# Patient Record
Sex: MALE | Race: WHITE | Employment: UNEMPLOYED | URBAN - METROPOLITAN AREA
[De-identification: names, ages, dates, MRNs, and addresses within clinical notes are randomized per-mention and may not be internally consistent; named-entity substitution may affect disease eponyms.]

---

## 2021-07-08 ENCOUNTER — OFFICE VISIT (OUTPATIENT)
Dept: INTERNAL MEDICINE CLINIC | Age: 21
End: 2021-07-08
Payer: MEDICAID

## 2021-07-08 VITALS
RESPIRATION RATE: 16 BRPM | TEMPERATURE: 98.1 F | SYSTOLIC BLOOD PRESSURE: 130 MMHG | HEART RATE: 75 BPM | BODY MASS INDEX: 39.17 KG/M2 | WEIGHT: 315 LBS | HEIGHT: 75 IN | DIASTOLIC BLOOD PRESSURE: 84 MMHG | OXYGEN SATURATION: 98 %

## 2021-07-08 DIAGNOSIS — Z00.00 ENCOUNTER FOR MEDICAL EXAMINATION TO ESTABLISH CARE: Primary | ICD-10-CM

## 2021-07-08 DIAGNOSIS — J45.20 MILD INTERMITTENT ASTHMA WITHOUT COMPLICATION: ICD-10-CM

## 2021-07-08 DIAGNOSIS — E55.9 VITAMIN D DEFICIENCY: ICD-10-CM

## 2021-07-08 DIAGNOSIS — F32.A DEPRESSION, UNSPECIFIED DEPRESSION TYPE: ICD-10-CM

## 2021-07-08 DIAGNOSIS — F41.9 ANXIETY: ICD-10-CM

## 2021-07-08 DIAGNOSIS — F90.9 ATTENTION DEFICIT HYPERACTIVITY DISORDER (ADHD), UNSPECIFIED ADHD TYPE: ICD-10-CM

## 2021-07-08 DIAGNOSIS — Z11.59 NEED FOR HEPATITIS C SCREENING TEST: ICD-10-CM

## 2021-07-08 DIAGNOSIS — F43.10 PTSD (POST-TRAUMATIC STRESS DISORDER): ICD-10-CM

## 2021-07-08 PROBLEM — J45.909 MILD ASTHMA: Status: ACTIVE | Noted: 2021-07-08

## 2021-07-08 PROCEDURE — 99203 OFFICE O/P NEW LOW 30 MIN: CPT | Performed by: INTERNAL MEDICINE

## 2021-07-08 RX ORDER — ALBUTEROL SULFATE 90 UG/1
AEROSOL, METERED RESPIRATORY (INHALATION)
COMMUNITY

## 2021-07-08 RX ORDER — LORATADINE 10 MG/1
10 TABLET ORAL
COMMUNITY

## 2021-07-08 RX ORDER — DEXTROAMPHETAMINE SACCHARATE, AMPHETAMINE ASPARTATE, DEXTROAMPHETAMINE SULFATE AND AMPHETAMINE SULFATE 7.5; 7.5; 7.5; 7.5 MG/1; MG/1; MG/1; MG/1
30 TABLET ORAL
COMMUNITY

## 2021-07-08 RX ORDER — OXCARBAZEPINE 150 MG/1
TABLET, FILM COATED ORAL
COMMUNITY

## 2021-07-08 RX ORDER — ALPRAZOLAM 1 MG/1
TABLET ORAL
COMMUNITY

## 2021-07-08 RX ORDER — DEXTROAMPHETAMINE SACCHARATE, AMPHETAMINE ASPARTATE MONOHYDRATE, DEXTROAMPHETAMINE SULFATE AND AMPHETAMINE SULFATE 7.5; 7.5; 7.5; 7.5 MG/1; MG/1; MG/1; MG/1
30 CAPSULE, EXTENDED RELEASE ORAL
COMMUNITY

## 2021-07-08 RX ORDER — LISINOPRIL 10 MG/1
TABLET ORAL DAILY
COMMUNITY
End: 2022-01-27 | Stop reason: SDUPTHER

## 2021-07-08 RX ORDER — DEXTROAMPHETAMINE SACCHARATE, AMPHETAMINE ASPARTATE MONOHYDRATE, DEXTROAMPHETAMINE SULFATE AND AMPHETAMINE SULFATE 5; 5; 5; 5 MG/1; MG/1; MG/1; MG/1
20 CAPSULE, EXTENDED RELEASE ORAL
COMMUNITY

## 2021-07-08 NOTE — PROGRESS NOTES
Chief Complaint   Patient presents with   [address] Other     bump on inner left thigh     3 most recent PHQ Screens 7/8/2021   Little interest or pleasure in doing things Several days   Feeling down, depressed, irritable, or hopeless Several days   Total Score PHQ 2 2     Visit Vitals  /84 (BP 1 Location: Left arm, BP Patient Position: Sitting, BP Cuff Size: Large adult)   Pulse 75   Temp 98.1 °F (36.7 °C) (Oral)   Resp 16   Ht 6' 3\" (1.905 m)   Wt (!) 365 lb 9.6 oz (165.8 kg)   SpO2 98%   BMI 45.70 kg/m²     1. Have you been to the ER, urgent care clinic since your last visit? Hospitalized since your last visit?no    2. Have you seen or consulted any other health care providers outside of the [address] [place] [address] since your last visit? Include any pap smears or colon screening.  Therapist

## 2021-07-08 NOTE — PROGRESS NOTES
HISTORY OF PRESENT ILLNESS  Elver Huitron is a 23 y.o. male. HPI Patient here to establish care. Reports history of PTSD, Depression, Anxiety and ADHD. He sees Psych for his mental health every three months. There as been no changes to his medication and states he feels well. Psych refills his medications. Reports history of seasonal allergies and use of prn Albuterol mostly in winter months. Reports a painful pimple on left inner thigh, that appears to be irritated upon exam, and there is no drainage. Reports having HPV immunization    Visit Vitals  /84 (BP 1 Location: Left arm, BP Patient Position: Sitting, BP Cuff Size: Large adult)   Pulse 75   Temp 98.1 °F (36.7 °C) (Oral)   Resp 16   Ht 6' 3\" (1.905 m)   Wt (!) 365 lb 9.6 oz (165.8 kg)   SpO2 98%   BMI 45.70 kg/m²     Past Medical History:   Diagnosis Date    ADHD (attention deficit hyperactivity disorder)     Anxiety     Asthma     Hypercholesteremia     Hypertension     Mood swings      Past Surgical History:   Procedure Laterality Date    HX WISDOM TEETH EXTRACTION       Cancer-related family history is not on file. Family History   Problem Relation Age of Onset    Diabetes Mother     Hypertension Mother     Asthma Mother    Haroldo Galla Schizophrenia Father      Outpatient Encounter Medications as of 7/8/2021   Medication Sig Dispense Refill    lisinopriL (PRINIVIL, ZESTRIL) 10 mg tablet Take  by mouth daily.  ALPRAZolam (XANAX) 1 mg tablet Take  by mouth.  dextroamphetamine-amphetamine (AdderalL) 30 mg tablet Take 30 mg by mouth.  amphetamine-dextroamphetamine XR (Adderall XR) 30 mg XR capsule Take 30 mg by mouth every morning.  amphetamine-dextroamphetamine XR (Adderall XR) 20 mg XR capsule Take 20 mg by mouth every morning.  loratadine (CLARITIN) 10 mg tablet Take 10 mg by mouth.  OXcarbazepine (TRILEPTAL) 150 mg tablet Take  by mouth.       albuterol (PROVENTIL HFA, VENTOLIN HFA, PROAIR HFA) 90 mcg/actuation inhaler Take  by inhalation.  ARIPiprazole (ABILIFY) 5 mg tablet Take  by mouth daily.  sertraline (ZOLOFT) 100 mg tablet Take  by mouth daily. No facility-administered encounter medications on file as of 7/8/2021. Review of Systems   Constitutional: Negative. HENT: Negative. Eyes: Negative. Respiratory: Negative. Cardiovascular: Negative. Gastrointestinal: Negative. Genitourinary: Negative. Musculoskeletal: Negative. Skin:        Reports painful pimple on the left inner thigh   Neurological: Negative. Endo/Heme/Allergies: Negative. Psychiatric/Behavioral: Negative. Physical Exam  Constitutional:       Appearance: Normal appearance. HENT:      Head: Normocephalic and atraumatic. Nose: Nose normal.      Mouth/Throat:      Mouth: Mucous membranes are dry. Eyes:      Extraocular Movements: Extraocular movements intact. Cardiovascular:      Rate and Rhythm: Normal rate and regular rhythm. Pulmonary:      Effort: Pulmonary effort is normal.      Breath sounds: Normal breath sounds. Abdominal:      Palpations: Abdomen is soft. Musculoskeletal:         General: Normal range of motion. Cervical back: Normal range of motion and neck supple. Skin:     Findings: Erythema present. Comments: Left inner thigh with a pimple that is slightly erythematous. No signs of infection. No heat    Neurological:      Mental Status: He is alert and oriented to person, place, and time. Psychiatric:         Mood and Affect: Mood normal.         ASSESSMENT and PLAN  Diagnoses and all orders for this visit:    1. Encounter for medical examination to establish care    2. Mild intermittent asthma without complication    3. PTSD (post-traumatic stress disorder)    4. Depression, unspecified depression type    5. Anxiety  -     CBC WITH AUTOMATED DIFF; Future  -     METABOLIC PANEL, COMPREHENSIVE; Future  -     TSH 3RD GENERATION; Future    6.  Attention deficit hyperactivity disorder (ADHD), unspecified ADHD type    7. Need for hepatitis C screening test  -     HEPATITIS C AB; Future    8. Vitamin D deficiency  -     VITAMIN D, 25 HYDROXY; Future      Follow-up and Dispositions    · Return in about 6 months (around 1/8/2022), or if symptoms worsen or fail to improve, for Follow up.       lab results and schedule of future lab studies reviewed with patient  reviewed diet, exercise and weight control  reviewed medications and side effects in detail   Advised patient to continue to monitor pimple on the left inner thigh. Report back if it increases in size, redness, swelling or has drainage. Also call back if he develops fever.

## 2021-07-09 LAB
25(OH)D3+25(OH)D2 SERPL-MCNC: 11.4 NG/ML (ref 30–100)
ALBUMIN SERPL-MCNC: 4.4 G/DL (ref 4.1–5.2)
ALBUMIN/GLOB SERPL: 1.6 {RATIO} (ref 1.2–2.2)
ALP SERPL-CCNC: 84 IU/L (ref 48–121)
ALT SERPL-CCNC: 48 IU/L (ref 0–44)
AST SERPL-CCNC: 25 IU/L (ref 0–40)
BASOPHILS # BLD AUTO: 0.1 X10E3/UL (ref 0–0.2)
BASOPHILS NFR BLD AUTO: 1 %
BILIRUB SERPL-MCNC: 0.2 MG/DL (ref 0–1.2)
BUN SERPL-MCNC: 8 MG/DL (ref 6–20)
BUN/CREAT SERPL: 10 (ref 9–20)
CALCIUM SERPL-MCNC: 9.9 MG/DL (ref 8.7–10.2)
CHLORIDE SERPL-SCNC: 101 MMOL/L (ref 96–106)
CO2 SERPL-SCNC: 26 MMOL/L (ref 20–29)
CREAT SERPL-MCNC: 0.8 MG/DL (ref 0.76–1.27)
EOSINOPHIL # BLD AUTO: 0.2 X10E3/UL (ref 0–0.4)
EOSINOPHIL NFR BLD AUTO: 2 %
ERYTHROCYTE [DISTWIDTH] IN BLOOD BY AUTOMATED COUNT: 13.1 % (ref 11.6–15.4)
GLOBULIN SER CALC-MCNC: 2.7 G/DL (ref 1.5–4.5)
GLUCOSE SERPL-MCNC: 83 MG/DL (ref 65–99)
HCT VFR BLD AUTO: 46.4 % (ref 37.5–51)
HCV AB S/CO SERPL IA: <0.1 S/CO RATIO (ref 0–0.9)
HGB BLD-MCNC: 15.8 G/DL (ref 13–17.7)
IMM GRANULOCYTES # BLD AUTO: 0 X10E3/UL (ref 0–0.1)
IMM GRANULOCYTES NFR BLD AUTO: 0 %
LYMPHOCYTES # BLD AUTO: 1.9 X10E3/UL (ref 0.7–3.1)
LYMPHOCYTES NFR BLD AUTO: 17 %
MCH RBC QN AUTO: 30 PG (ref 26.6–33)
MCHC RBC AUTO-ENTMCNC: 34.1 G/DL (ref 31.5–35.7)
MCV RBC AUTO: 88 FL (ref 79–97)
MONOCYTES # BLD AUTO: 0.6 X10E3/UL (ref 0.1–0.9)
MONOCYTES NFR BLD AUTO: 6 %
NEUTROPHILS # BLD AUTO: 8.1 X10E3/UL (ref 1.4–7)
NEUTROPHILS NFR BLD AUTO: 74 %
PLATELET # BLD AUTO: 220 X10E3/UL (ref 150–450)
POTASSIUM SERPL-SCNC: 4.5 MMOL/L (ref 3.5–5.2)
PROT SERPL-MCNC: 7.1 G/DL (ref 6–8.5)
RBC # BLD AUTO: 5.27 X10E6/UL (ref 4.14–5.8)
SODIUM SERPL-SCNC: 145 MMOL/L (ref 134–144)
TSH SERPL DL<=0.005 MIU/L-ACNC: 3.03 UIU/ML (ref 0.45–4.5)
WBC # BLD AUTO: 10.8 X10E3/UL (ref 3.4–10.8)

## 2021-07-09 RX ORDER — ERGOCALCIFEROL 1.25 MG/1
50000 CAPSULE ORAL
Qty: 12 CAPSULE | Refills: 0 | Status: SHIPPED | OUTPATIENT
Start: 2021-07-09

## 2021-07-09 NOTE — PROGRESS NOTES
Vitamin d low. Will replace it weekly and the go to a daily supplement of 2000u.   All other labs stable

## 2022-01-27 ENCOUNTER — OFFICE VISIT (OUTPATIENT)
Dept: INTERNAL MEDICINE CLINIC | Age: 22
End: 2022-01-27
Payer: MEDICAID

## 2022-01-27 VITALS
RESPIRATION RATE: 20 BRPM | HEIGHT: 75 IN | WEIGHT: 315 LBS | BODY MASS INDEX: 39.17 KG/M2 | HEART RATE: 70 BPM | OXYGEN SATURATION: 98 % | DIASTOLIC BLOOD PRESSURE: 84 MMHG | SYSTOLIC BLOOD PRESSURE: 132 MMHG | TEMPERATURE: 98.5 F

## 2022-01-27 DIAGNOSIS — M54.9 MID BACK PAIN: ICD-10-CM

## 2022-01-27 DIAGNOSIS — R23.8 DRY SCALP: ICD-10-CM

## 2022-01-27 DIAGNOSIS — F41.9 ANXIETY: ICD-10-CM

## 2022-01-27 DIAGNOSIS — I10 HYPERTENSION, UNSPECIFIED TYPE: Primary | ICD-10-CM

## 2022-01-27 PROCEDURE — 99214 OFFICE O/P EST MOD 30 MIN: CPT | Performed by: INTERNAL MEDICINE

## 2022-01-27 RX ORDER — KETOCONAZOLE 20 MG/ML
1 SHAMPOO TOPICAL AS NEEDED
Qty: 1 EACH | Refills: 1 | Status: SHIPPED | OUTPATIENT
Start: 2022-01-27 | End: 2022-08-12

## 2022-01-27 RX ORDER — LISINOPRIL 10 MG/1
10 TABLET ORAL DAILY
Qty: 90 TABLET | Refills: 1 | Status: SHIPPED | OUTPATIENT
Start: 2022-01-27 | End: 2022-07-14

## 2022-01-27 NOTE — PROGRESS NOTES
Health Maintenance Due   Topic Date Due    DTaP/Tdap/Td series (1 - Tdap) Never done    Flu Vaccine (1) 09/01/2021    COVID-19 Vaccine (3 - Booster for Pfizer series) 09/15/2021       Chief Complaint   Patient presents with    ED Follow-up    Back Pain    Medication Refill       1. Have you been to the ER, urgent care clinic since your last visit? Hospitalized since your last visit? No    2. Have you seen or consulted any other health care providers outside of the [address] [place] [address] since your last visit? Include any pap smears or colon screening. No    3) Do you have an Advance Directive on file? no    4) Are you interested in receiving information on Advance Directives? NO      Patient is accompanied by self and patient I have received verbal consent from Jorge Mahmood to discuss any/all medical information while they are present in the room.

## 2022-01-27 NOTE — PROGRESS NOTES
HISTORY OF PRESENT ILLNESS  Laly Newby is a 24 y.o. male. Patient was seen for an ED visit and follow up. Reports that he was working with his sick dog. Had been lifting and pushing him. Reports that he began to have some sharp pain to the back. This occurred last week. Is not better, but not worse. Has been taking OTC and muscle relaxer's. Which this helps. No radiation, tingling or numbness. Continues to see psych every 3 months. No changes to current medications. HTN: stable. Does not watch his BP at home. Does eat a lot of easy meals that have salt. Is COVID and flu vaccinated. Visit Vitals  /84 (BP 1 Location: Right arm, BP Patient Position: Sitting, BP Cuff Size: Adult)   Pulse 70   Temp 98.5 °F (36.9 °C) (Oral)   Resp 20   Ht 6' 3\" (1.905 m)   Wt (!) 381 lb (172.8 kg)   SpO2 98%   BMI 47.62 kg/m²     Past Medical History:   Diagnosis Date    ADHD (attention deficit hyperactivity disorder)     Anxiety     Asthma     Hypercholesteremia     Hypertension     Mood swings      Past Surgical History:   Procedure Laterality Date    HX WISDOM TEETH EXTRACTION       Family History   Problem Relation Age of Onset    Diabetes Mother     Hypertension Mother     Asthma Mother     Schizophrenia Father      Outpatient Encounter Medications as of 1/27/2022   Medication Sig Dispense Refill    lisinopriL (PRINIVIL, ZESTRIL) 10 mg tablet Take 1 Tablet by mouth daily. 90 Tablet 1    ketoconazole (NIZORAL) 2 % shampoo Apply 120 mL to affected area as needed for Itching. 1 Each 1    ergocalciferol (ERGOCALCIFEROL) 1,250 mcg (50,000 unit) capsule Take 1 Capsule by mouth every seven (7) days. 12 Capsule 0    ALPRAZolam (XANAX) 1 mg tablet Take  by mouth.  dextroamphetamine-amphetamine (AdderalL) 30 mg tablet Take 30 mg by mouth.  amphetamine-dextroamphetamine XR (Adderall XR) 30 mg XR capsule Take 30 mg by mouth every morning.       amphetamine-dextroamphetamine XR (Adderall XR) 20 mg XR capsule Take 20 mg by mouth every morning.  loratadine (CLARITIN) 10 mg tablet Take 10 mg by mouth.  OXcarbazepine (TRILEPTAL) 150 mg tablet Take  by mouth.  albuterol (PROVENTIL HFA, VENTOLIN HFA, PROAIR HFA) 90 mcg/actuation inhaler Take  by inhalation.  ARIPiprazole (ABILIFY) 5 mg tablet Take  by mouth daily.  sertraline (ZOLOFT) 100 mg tablet Take  by mouth daily.  [DISCONTINUED] lisinopriL (PRINIVIL, ZESTRIL) 10 mg tablet Take  by mouth daily. No facility-administered encounter medications on file as of 1/27/2022. HPI    Review of Systems   Constitutional: Negative. Respiratory: Negative. Cardiovascular: Negative. Gastrointestinal: Negative. Musculoskeletal: Positive for back pain. Skin: Positive for rash. Neurological: Negative. Psychiatric/Behavioral: Negative. Physical Exam  Vitals and nursing note reviewed. Cardiovascular:      Rate and Rhythm: Normal rate and regular rhythm. Pulmonary:      Effort: Pulmonary effort is normal.      Breath sounds: Normal breath sounds. Abdominal:      Palpations: Abdomen is soft. Musculoskeletal:      Thoracic back: Spasms present. No tenderness. Lumbar back: Normal.        Back:    Skin:     General: Skin is warm. Neurological:      Mental Status: He is alert and oriented to person, place, and time. Psychiatric:         Behavior: Behavior normal.         ASSESSMENT and PLAN  Diagnoses and all orders for this visit:    1. Hypertension, unspecified type  -     lisinopriL (PRINIVIL, ZESTRIL) 10 mg tablet; Take 1 Tablet by mouth daily. -     DASH diet reviewed. 2. Dry scalp  -     ketoconazole (NIZORAL) 2 % shampoo; Apply 120 mL to affected area as needed for Itching. 3. Mid back pain        -     OTC every 8 hours         -     Reviewed ROM  4. Anxiety      Follow-up and Dispositions    · Return in about 6 months (around 7/27/2022), or if symptoms worsen or fail to improve. lab results and schedule of future lab studies reviewed with patient  reviewed diet, exercise and weight control  reviewed medications and side effects in detail

## 2022-03-18 PROBLEM — F43.10 PTSD (POST-TRAUMATIC STRESS DISORDER): Status: ACTIVE | Noted: 2021-07-08

## 2022-03-19 PROBLEM — F41.9 ANXIETY: Status: ACTIVE | Noted: 2021-07-08

## 2022-03-19 PROBLEM — J45.909 MILD ASTHMA: Status: ACTIVE | Noted: 2021-07-08

## 2022-03-19 PROBLEM — F90.9 ADHD: Status: ACTIVE | Noted: 2021-07-08

## 2022-03-20 PROBLEM — F32.A DEPRESSION: Status: ACTIVE | Noted: 2021-07-08

## 2022-07-13 DIAGNOSIS — I10 HYPERTENSION, UNSPECIFIED TYPE: ICD-10-CM

## 2022-07-14 RX ORDER — LISINOPRIL 10 MG/1
TABLET ORAL
Qty: 30 TABLET | Refills: 5 | Status: SHIPPED | OUTPATIENT
Start: 2022-07-14 | End: 2022-09-13 | Stop reason: SDUPTHER

## 2022-08-12 DIAGNOSIS — R23.8 DRY SCALP: ICD-10-CM

## 2022-08-12 RX ORDER — KETOCONAZOLE 20 MG/ML
1 SHAMPOO TOPICAL AS NEEDED
Qty: 120 ML | Refills: 1 | Status: SHIPPED | OUTPATIENT
Start: 2022-08-12 | End: 2022-09-13 | Stop reason: SDUPTHER

## 2022-09-13 ENCOUNTER — OFFICE VISIT (OUTPATIENT)
Dept: INTERNAL MEDICINE CLINIC | Age: 22
End: 2022-09-13
Payer: MEDICAID

## 2022-09-13 VITALS
TEMPERATURE: 98.9 F | WEIGHT: 315 LBS | OXYGEN SATURATION: 97 % | DIASTOLIC BLOOD PRESSURE: 80 MMHG | RESPIRATION RATE: 18 BRPM | BODY MASS INDEX: 39.17 KG/M2 | HEART RATE: 87 BPM | HEIGHT: 75 IN | SYSTOLIC BLOOD PRESSURE: 132 MMHG

## 2022-09-13 DIAGNOSIS — Z23 ENCOUNTER FOR IMMUNIZATION: ICD-10-CM

## 2022-09-13 DIAGNOSIS — I10 HYPERTENSION, UNSPECIFIED TYPE: ICD-10-CM

## 2022-09-13 DIAGNOSIS — R23.8 DRY SCALP: Primary | ICD-10-CM

## 2022-09-13 PROCEDURE — 99213 OFFICE O/P EST LOW 20 MIN: CPT | Performed by: INTERNAL MEDICINE

## 2022-09-13 PROCEDURE — 90686 IIV4 VACC NO PRSV 0.5 ML IM: CPT | Performed by: INTERNAL MEDICINE

## 2022-09-13 RX ORDER — KETOCONAZOLE 20 MG/ML
1 SHAMPOO TOPICAL AS NEEDED
Qty: 120 ML | Refills: 1 | Status: SHIPPED | OUTPATIENT
Start: 2022-09-13

## 2022-09-13 RX ORDER — LISINOPRIL 10 MG/1
10 TABLET ORAL DAILY
Qty: 90 TABLET | Refills: 1 | Status: SHIPPED | OUTPATIENT
Start: 2022-09-13

## 2022-09-13 NOTE — PROGRESS NOTES
HISTORY OF PRESENT ILLNESS  General Urban is a 24 y.o. male. Patient was seen for a follow up. Reports that he has been taking his BP medications as directed. Watches his salt. Does not take his BP at home. Would like a refill on the shampoo for his hair. This did help. Also will take a flu vaccine. Still seeing psych every 3 months. Is doing well on all of his medications. Visit Vitals  /80 (BP 1 Location: Left upper arm, BP Patient Position: Sitting, BP Cuff Size: Adult)   Pulse 87   Temp 98.9 °F (37.2 °C) (Oral)   Resp 18   Ht 6' 3\" (1.905 m)   Wt (!) 387 lb 6 oz (175.7 kg)   SpO2 97%   BMI 48.42 kg/m²     Past Medical History:   Diagnosis Date    ADHD (attention deficit hyperactivity disorder)     Anxiety     Asthma     Hypercholesteremia     Hypertension     Mood swings      Past Surgical History:   Procedure Laterality Date    HX WISDOM TEETH EXTRACTION       Family History   Problem Relation Age of Onset    Diabetes Mother     Hypertension Mother     Asthma Mother     Schizophrenia Father      Outpatient Encounter Medications as of 9/13/2022   Medication Sig Dispense Refill    ketoconazole (NIZORAL) 2 % shampoo Apply 120 mL to affected area as needed for Itching. 120 mL 1    lisinopriL (PRINIVIL, ZESTRIL) 10 mg tablet Take 1 Tablet by mouth daily. 90 Tablet 1    ergocalciferol (ERGOCALCIFEROL) 1,250 mcg (50,000 unit) capsule Take 1 Capsule by mouth every seven (7) days. 12 Capsule 0    ALPRAZolam (XANAX) 1 mg tablet Take  by mouth. dextroamphetamine-amphetamine (ADDERALL) 30 mg tablet Take 30 mg by mouth. amphetamine-dextroamphetamine XR (ADDERALL XR) 30 mg XR capsule Take 30 mg by mouth every morning. amphetamine-dextroamphetamine XR (ADDERALL XR) 20 mg XR capsule Take 20 mg by mouth every morning. loratadine (CLARITIN) 10 mg tablet Take 10 mg by mouth. OXcarbazepine (TRILEPTAL) 150 mg tablet Take  by mouth.       albuterol (PROVENTIL HFA, VENTOLIN HFA, PROAIR HFA) 90 mcg/actuation inhaler Take  by inhalation. ARIPiprazole (ABILIFY) 5 mg tablet Take  by mouth daily. sertraline (ZOLOFT) 100 mg tablet Take  by mouth daily. [DISCONTINUED] ketoconazole (NIZORAL) 2 % shampoo APPLY 120 ML TO AFFECTED AREA AS NEEDED FOR ITCHING. 120 mL 1    [DISCONTINUED] lisinopriL (PRINIVIL, ZESTRIL) 10 mg tablet TAKE 1 TABLET BY MOUTH EVERY DAY 30 Tablet 5     No facility-administered encounter medications on file as of 9/13/2022. HPI    Review of Systems   Constitutional: Negative. Respiratory: Negative. Cardiovascular: Negative. Gastrointestinal: Negative. Skin:         Dry scalp    Neurological: Negative. Physical Exam  Vitals and nursing note reviewed. Constitutional:       Appearance: He is obese. Cardiovascular:      Rate and Rhythm: Normal rate and regular rhythm. Pulmonary:      Effort: Pulmonary effort is normal.      Breath sounds: Normal breath sounds. Abdominal:      Palpations: Abdomen is soft. Musculoskeletal:         General: Normal range of motion. Skin:     General: Skin is warm. Neurological:      Mental Status: He is alert and oriented to person, place, and time. Psychiatric:         Behavior: Behavior normal.       ASSESSMENT and PLAN  Diagnoses and all orders for this visit:    1. Dry scalp  -     ketoconazole (NIZORAL) 2 % shampoo; Apply 120 mL to affected area as needed for Itching. 2. Hypertension, unspecified type  -     lisinopriL (PRINIVIL, ZESTRIL) 10 mg tablet; Take 1 Tablet by mouth daily.     3. Encounter for immunization  -     INFLUENZA, FLUARIX, FLULAVAL, FLUZONE (AGE 6 MO+), AFLURIA(AGE 3Y+) IM, PF, 0.5 ML      Follow-up and Dispositions    Return in about 6 months (around 3/13/2023), or if symptoms worsen or fail to improve.       lab results and schedule of future lab studies reviewed with patient  reviewed medications and side effects in detail

## 2022-09-13 NOTE — PROGRESS NOTES
Chief Complaint   Patient presents with    Hypertension    Anxiety       Vitals:    09/13/22 1333   BP: 132/80   Pulse: 87   Resp: 18   Temp: 98.9 °F (37.2 °C)   TempSrc: Oral   SpO2: 97%   Weight: (!) 387 lb 6 oz (175.7 kg)   Height: 6' 3\" (1.905 m)   PainSc:   0 - No pain         Health Maintenance will be followed up by PCP. 1. Have you been to the ER, urgent care clinic since your last visit? Hospitalized since your last visit? No    2. Have you seen or consulted any other health care providers outside of the [address] [place] [address] since your last visit? Include any pap smears or colon screening.  No

## 2022-09-13 NOTE — PROGRESS NOTES
After obtaining consent, and per orders of GABE Shah, injection of Influenza in right deltoid of 0.5 ml given by Kali Clarke LPN. Patient instructed to remain in clinic for 15 minutes afterwards, and to report any adverse reaction to me immediately.

## 2023-03-06 ENCOUNTER — OFFICE VISIT (OUTPATIENT)
Dept: INTERNAL MEDICINE CLINIC | Age: 23
End: 2023-03-06
Payer: MEDICAID

## 2023-03-06 ENCOUNTER — HOSPITAL ENCOUNTER (OUTPATIENT)
Dept: GENERAL RADIOLOGY | Age: 23
Discharge: HOME | End: 2023-03-06
Payer: MEDICAID

## 2023-03-06 VITALS
BODY MASS INDEX: 39.17 KG/M2 | OXYGEN SATURATION: 97 % | DIASTOLIC BLOOD PRESSURE: 72 MMHG | TEMPERATURE: 98.9 F | HEIGHT: 75 IN | RESPIRATION RATE: 16 BRPM | SYSTOLIC BLOOD PRESSURE: 128 MMHG | HEART RATE: 81 BPM | WEIGHT: 315 LBS

## 2023-03-06 DIAGNOSIS — M79.641 PAIN OF RIGHT HAND: ICD-10-CM

## 2023-03-06 DIAGNOSIS — I10 HYPERTENSION, UNSPECIFIED TYPE: ICD-10-CM

## 2023-03-06 DIAGNOSIS — F41.9 ANXIETY: ICD-10-CM

## 2023-03-06 DIAGNOSIS — M79.644 FINGER PAIN, RIGHT: Primary | ICD-10-CM

## 2023-03-06 DIAGNOSIS — M79.644 FINGER PAIN, RIGHT: ICD-10-CM

## 2023-03-06 DIAGNOSIS — E55.9 VITAMIN D DEFICIENCY: ICD-10-CM

## 2023-03-06 PROCEDURE — 73130 X-RAY EXAM OF HAND: CPT

## 2023-03-06 PROCEDURE — 99213 OFFICE O/P EST LOW 20 MIN: CPT | Performed by: INTERNAL MEDICINE

## 2023-03-06 NOTE — PROGRESS NOTES
Health Maintenance Due   Topic Date Due    Pneumococcal 0-64 years (1 - PCV) Never done    DTaP/Tdap/Td series (1 - Tdap) Never done    COVID-19 Vaccine (3 - Booster for Pfizer series) 06/10/2021       Chief Complaint   Patient presents with    Asthma    Anxiety    Follow Up Chronic Condition       1. Have you been to the ER, urgent care clinic since your last visit? Hospitalized since your last visit? No    2. Have you seen or consulted any other health care providers outside of the [address] [place] [address] since your last visit? Include any pap smears or colon screening. No    3) Do you have an Advance Directive on file? no    4) Are you interested in receiving information on Advance Directives? NO      Patient is accompanied by self I have received verbal consent from Dg Gong to discuss any/all medical information while they are present in the room.

## 2023-03-06 NOTE — PROGRESS NOTES
HISTORY OF PRESENT ILLNESS  Lara Goldberg is a 20 y.o. male. Patient was seen for a follow up. Reports that in the last few weeks he has began to have right index finger pain and a tremor to the hand. Does type on a computer daily. To the PIP joint of the finger it is tender to touch. Also notes that the pain can radiate in to the top of the hand near the ringer finger. Denies swelling or injury. Has not taken anything to help. HTN: stable and tries to watch his sodium intake. Is still being seen by psych. This is going well. Will need blood work soon. Finger Pain    Anxiety    Follow Up Chronic Condition  Visit Vitals  /72 (BP 1 Location: Left upper arm, BP Patient Position: Sitting, BP Cuff Size: Adult)   Pulse 81   Temp 98.9 °F (37.2 °C) (Oral)   Resp 16   Ht 6' 3\" (1.905 m)   Wt (!) 387 lb (175.5 kg)   SpO2 97%   BMI 48.37 kg/m²     Past Medical History:   Diagnosis Date    ADHD (attention deficit hyperactivity disorder)     Anxiety     Asthma     Hypercholesteremia     Hypertension     Mood swings      Past Surgical History:   Procedure Laterality Date    HX WISDOM TEETH EXTRACTION       Family History   Problem Relation Age of Onset    Diabetes Mother     Hypertension Mother     Asthma Mother     Schizophrenia Father      Outpatient Encounter Medications as of 3/6/2023   Medication Sig Dispense Refill    ketoconazole (NIZORAL) 2 % shampoo Apply 120 mL to affected area as needed for Itching. 120 mL 1    lisinopriL (PRINIVIL, ZESTRIL) 10 mg tablet Take 1 Tablet by mouth daily. 90 Tablet 1    ALPRAZolam (XANAX) 1 mg tablet Take  by mouth. dextroamphetamine-amphetamine (ADDERALL) 30 mg tablet Take 30 mg by mouth. amphetamine-dextroamphetamine XR (ADDERALL XR) 30 mg XR capsule Take 30 mg by mouth every morning. amphetamine-dextroamphetamine XR (ADDERALL XR) 20 mg XR capsule Take 20 mg by mouth every morning. loratadine (CLARITIN) 10 mg tablet Take 10 mg by mouth. OXcarbazepine (TRILEPTAL) 150 mg tablet Take  by mouth. albuterol (PROVENTIL HFA, VENTOLIN HFA, PROAIR HFA) 90 mcg/actuation inhaler Take  by inhalation. sertraline (ZOLOFT) 100 mg tablet Take  by mouth daily. [DISCONTINUED] ergocalciferol (ERGOCALCIFEROL) 1,250 mcg (50,000 unit) capsule Take 1 Capsule by mouth every seven (7) days. 12 Capsule 0    [DISCONTINUED] ARIPiprazole (ABILIFY) 5 mg tablet Take  by mouth daily. No facility-administered encounter medications on file as of 3/6/2023. Review of Systems   Constitutional: Negative. Respiratory: Negative. Cardiovascular: Negative. Gastrointestinal: Negative. Musculoskeletal:  Positive for joint pain. Neurological:  Positive for tremors. Negative for dizziness and focal weakness. Psychiatric/Behavioral:  The patient is nervous/anxious. Physical Exam  Vitals and nursing note reviewed. Constitutional:       Appearance: He is obese. Cardiovascular:      Rate and Rhythm: Normal rate and regular rhythm. Pulmonary:      Effort: Pulmonary effort is normal.      Breath sounds: Normal breath sounds. Abdominal:      Palpations: Abdomen is soft. Musculoskeletal:        Hands:       Comments: No tremor on assessment,  equal, slight pain when I touch the PIP joint of index finger. No swelling    Skin:     General: Skin is warm. Neurological:      Mental Status: He is alert and oriented to person, place, and time. Psychiatric:         Behavior: Behavior normal.       ASSESSMENT and PLAN  Diagnoses and all orders for this visit:    1. Finger pain, right  -     XR HAND RT MIN 3 V; Future    2. Pain of right hand  -     XR HAND RT MIN 3 V; Future    3. Anxiety  -     TSH 3RD GENERATION; Future    4. Hypertension, unspecified type  -     METABOLIC PANEL, COMPREHENSIVE; Future  -     CBC WITH AUTOMATED DIFF; Future    5.  Vitamin D deficiency  -     VITAMIN D, 25 HYDROXY; Future      Follow-up and Dispositions Return in about 6 months (around 9/6/2023), or if symptoms worsen or fail to improve.       lab results and schedule of future lab studies reviewed with patient  reviewed diet, exercise and weight control  reviewed medications and side effects in detail

## 2023-03-07 ENCOUNTER — TELEPHONE (OUTPATIENT)
Dept: INTERNAL MEDICINE CLINIC | Age: 23
End: 2023-03-07

## 2023-03-07 DIAGNOSIS — E55.9 VITAMIN D DEFICIENCY: Primary | ICD-10-CM

## 2023-03-07 LAB
25(OH)D3+25(OH)D2 SERPL-MCNC: 14.4 NG/ML (ref 30–100)
ALBUMIN SERPL-MCNC: 4.3 G/DL (ref 4.1–5.2)
ALBUMIN/GLOB SERPL: 1.7 {RATIO} (ref 1.2–2.2)
ALP SERPL-CCNC: 79 IU/L (ref 44–121)
ALT SERPL-CCNC: 61 IU/L (ref 0–44)
AST SERPL-CCNC: 30 IU/L (ref 0–40)
BASOPHILS # BLD AUTO: 0.1 X10E3/UL (ref 0–0.2)
BASOPHILS NFR BLD AUTO: 1 %
BILIRUB SERPL-MCNC: 0.4 MG/DL (ref 0–1.2)
BUN SERPL-MCNC: 8 MG/DL (ref 6–20)
BUN/CREAT SERPL: 9 (ref 9–20)
CALCIUM SERPL-MCNC: 9.9 MG/DL (ref 8.7–10.2)
CHLORIDE SERPL-SCNC: 101 MMOL/L (ref 96–106)
CO2 SERPL-SCNC: 24 MMOL/L (ref 20–29)
CREAT SERPL-MCNC: 0.87 MG/DL (ref 0.76–1.27)
EGFRCR SERPLBLD CKD-EPI 2021: 124 ML/MIN/1.73
EOSINOPHIL # BLD AUTO: 0.1 X10E3/UL (ref 0–0.4)
EOSINOPHIL NFR BLD AUTO: 1 %
ERYTHROCYTE [DISTWIDTH] IN BLOOD BY AUTOMATED COUNT: 13.8 % (ref 11.6–15.4)
GLOBULIN SER CALC-MCNC: 2.5 G/DL (ref 1.5–4.5)
GLUCOSE SERPL-MCNC: 185 MG/DL (ref 70–99)
HCT VFR BLD AUTO: 48.1 % (ref 37.5–51)
HGB BLD-MCNC: 16 G/DL (ref 13–17.7)
IMM GRANULOCYTES # BLD AUTO: 0 X10E3/UL (ref 0–0.1)
IMM GRANULOCYTES NFR BLD AUTO: 0 %
LYMPHOCYTES # BLD AUTO: 1.9 X10E3/UL (ref 0.7–3.1)
LYMPHOCYTES NFR BLD AUTO: 21 %
MCH RBC QN AUTO: 28.9 PG (ref 26.6–33)
MCHC RBC AUTO-ENTMCNC: 33.3 G/DL (ref 31.5–35.7)
MCV RBC AUTO: 87 FL (ref 79–97)
MONOCYTES # BLD AUTO: 0.6 X10E3/UL (ref 0.1–0.9)
MONOCYTES NFR BLD AUTO: 6 %
NEUTROPHILS # BLD AUTO: 6.6 X10E3/UL (ref 1.4–7)
NEUTROPHILS NFR BLD AUTO: 71 %
PLATELET # BLD AUTO: 196 X10E3/UL (ref 150–450)
POTASSIUM SERPL-SCNC: 4.6 MMOL/L (ref 3.5–5.2)
PROT SERPL-MCNC: 6.8 G/DL (ref 6–8.5)
RBC # BLD AUTO: 5.53 X10E6/UL (ref 4.14–5.8)
SODIUM SERPL-SCNC: 140 MMOL/L (ref 134–144)
TSH SERPL DL<=0.005 MIU/L-ACNC: 1.89 UIU/ML (ref 0.45–4.5)
WBC # BLD AUTO: 9.2 X10E3/UL (ref 3.4–10.8)

## 2023-03-07 RX ORDER — ERGOCALCIFEROL 1.25 MG/1
50000 CAPSULE ORAL
Qty: 12 CAPSULE | Refills: 0 | Status: SHIPPED | OUTPATIENT
Start: 2023-03-07

## 2023-03-07 NOTE — TELEPHONE ENCOUNTER
Pt mother is calling for x-ray results and to see what the providers recommendations were regarding his recent lab results

## 2023-03-07 NOTE — PROGRESS NOTES
Please add a1c. Blood sugar high. Liver levels are increasing such as ALT.  If using alcohol and tylenol he needs to slow down  Vitamin d low, send [id number] weekly and then take OTC daily

## 2023-03-08 LAB
HBA1C MFR BLD: 6.2 % (ref 4.8–5.6)
SPECIMEN STATUS REPORT, ROLRST: NORMAL

## 2023-07-07 DIAGNOSIS — I10 ESSENTIAL (PRIMARY) HYPERTENSION: ICD-10-CM

## 2023-07-07 RX ORDER — LISINOPRIL 10 MG/1
TABLET ORAL
Qty: 90 TABLET | Refills: 1 | Status: SHIPPED | OUTPATIENT
Start: 2023-07-07

## 2023-09-05 ENCOUNTER — OFFICE VISIT (OUTPATIENT)
Age: 23
End: 2023-09-05
Payer: MEDICAID

## 2023-09-05 VITALS
WEIGHT: 315 LBS | SYSTOLIC BLOOD PRESSURE: 118 MMHG | BODY MASS INDEX: 39.17 KG/M2 | HEART RATE: 69 BPM | TEMPERATURE: 97.7 F | DIASTOLIC BLOOD PRESSURE: 84 MMHG | OXYGEN SATURATION: 97 % | HEIGHT: 75 IN | RESPIRATION RATE: 17 BRPM

## 2023-09-05 DIAGNOSIS — E55.9 VITAMIN D DEFICIENCY, UNSPECIFIED: ICD-10-CM

## 2023-09-05 DIAGNOSIS — R73.03 PRE-DIABETES: Primary | ICD-10-CM

## 2023-09-05 DIAGNOSIS — E66.01 MORBID OBESITY (HCC): ICD-10-CM

## 2023-09-05 DIAGNOSIS — R73.03 PRE-DIABETES: ICD-10-CM

## 2023-09-05 DIAGNOSIS — I10 ESSENTIAL (PRIMARY) HYPERTENSION: ICD-10-CM

## 2023-09-05 PROCEDURE — 3074F SYST BP LT 130 MM HG: CPT | Performed by: INTERNAL MEDICINE

## 2023-09-05 PROCEDURE — 99213 OFFICE O/P EST LOW 20 MIN: CPT | Performed by: INTERNAL MEDICINE

## 2023-09-05 PROCEDURE — 3079F DIAST BP 80-89 MM HG: CPT | Performed by: INTERNAL MEDICINE

## 2023-09-05 RX ORDER — TACROLIMUS 1 MG/G
OINTMENT TOPICAL
COMMUNITY
Start: 2023-08-20

## 2023-09-05 RX ORDER — CLOBETASOL PROPIONATE 0.46 MG/ML
SOLUTION TOPICAL
COMMUNITY
Start: 2023-06-09

## 2023-09-05 RX ORDER — LISINOPRIL 10 MG/1
10 TABLET ORAL DAILY
Qty: 90 TABLET | Refills: 1 | Status: SHIPPED | OUTPATIENT
Start: 2023-09-05

## 2023-09-05 RX ORDER — LORATADINE PSEUDOEPHEDRINE SULFATE 10; 240 MG/1; MG/1
1 TABLET, EXTENDED RELEASE ORAL DAILY PRN
COMMUNITY

## 2023-09-08 LAB
25(OH)D3+25(OH)D2 SERPL-MCNC: 19 NG/ML (ref 30–100)
ALBUMIN SERPL-MCNC: 4.5 G/DL (ref 4.3–5.2)
ALBUMIN/GLOB SERPL: 1.9 {RATIO} (ref 1.2–2.2)
ALP SERPL-CCNC: 74 IU/L (ref 44–121)
ALT SERPL-CCNC: 34 IU/L (ref 0–44)
AST SERPL-CCNC: 16 IU/L (ref 0–40)
BILIRUB SERPL-MCNC: 0.4 MG/DL (ref 0–1.2)
BUN SERPL-MCNC: 9 MG/DL (ref 6–20)
BUN/CREAT SERPL: 10 (ref 9–20)
CALCIUM SERPL-MCNC: 9.7 MG/DL (ref 8.7–10.2)
CHLORIDE SERPL-SCNC: 100 MMOL/L (ref 96–106)
CO2 SERPL-SCNC: 23 MMOL/L (ref 20–29)
CREAT SERPL-MCNC: 0.89 MG/DL (ref 0.76–1.27)
EGFRCR SERPLBLD CKD-EPI 2021: 123 ML/MIN/1.73
GLOBULIN SER CALC-MCNC: 2.4 G/DL (ref 1.5–4.5)
GLUCOSE SERPL-MCNC: 123 MG/DL (ref 70–99)
HBA1C MFR BLD: 6.2 % (ref 4.8–5.6)
POTASSIUM SERPL-SCNC: 4.1 MMOL/L (ref 3.5–5.2)
PROT SERPL-MCNC: 6.9 G/DL (ref 6–8.5)
SODIUM SERPL-SCNC: 139 MMOL/L (ref 134–144)

## 2023-09-13 DIAGNOSIS — E55.9 VITAMIN D DEFICIENCY, UNSPECIFIED: Primary | ICD-10-CM

## 2023-09-13 RX ORDER — ERGOCALCIFEROL 1.25 MG/1
50000 CAPSULE ORAL WEEKLY
Qty: 12 CAPSULE | Refills: 0 | Status: SHIPPED | OUTPATIENT
Start: 2023-09-13

## 2024-03-28 DIAGNOSIS — I10 ESSENTIAL (PRIMARY) HYPERTENSION: ICD-10-CM

## 2024-03-28 RX ORDER — LISINOPRIL 10 MG/1
10 TABLET ORAL DAILY
Qty: 90 TABLET | Refills: 0 | Status: SHIPPED | OUTPATIENT
Start: 2024-03-28

## 2024-04-04 ENCOUNTER — OFFICE VISIT (OUTPATIENT)
Age: 24
End: 2024-04-04
Payer: MEDICAID

## 2024-04-04 VITALS
HEART RATE: 76 BPM | TEMPERATURE: 98.7 F | RESPIRATION RATE: 16 BRPM | OXYGEN SATURATION: 97 % | HEIGHT: 75 IN | SYSTOLIC BLOOD PRESSURE: 124 MMHG | BODY MASS INDEX: 39.17 KG/M2 | WEIGHT: 315 LBS | DIASTOLIC BLOOD PRESSURE: 82 MMHG

## 2024-04-04 DIAGNOSIS — R35.89 POLYURIA: ICD-10-CM

## 2024-04-04 DIAGNOSIS — R63.1 POLYDIPSIA: ICD-10-CM

## 2024-04-04 DIAGNOSIS — R63.1 POLYDIPSIA: Primary | ICD-10-CM

## 2024-04-04 DIAGNOSIS — R53.83 FATIGUE, UNSPECIFIED TYPE: ICD-10-CM

## 2024-04-04 DIAGNOSIS — Z13.220 LIPID SCREENING: ICD-10-CM

## 2024-04-04 PROCEDURE — 99214 OFFICE O/P EST MOD 30 MIN: CPT | Performed by: INTERNAL MEDICINE

## 2024-04-04 SDOH — ECONOMIC STABILITY: FOOD INSECURITY: WITHIN THE PAST 12 MONTHS, YOU WORRIED THAT YOUR FOOD WOULD RUN OUT BEFORE YOU GOT MONEY TO BUY MORE.: NEVER TRUE

## 2024-04-04 SDOH — ECONOMIC STABILITY: FOOD INSECURITY: WITHIN THE PAST 12 MONTHS, THE FOOD YOU BOUGHT JUST DIDN'T LAST AND YOU DIDN'T HAVE MONEY TO GET MORE.: NEVER TRUE

## 2024-04-04 SDOH — ECONOMIC STABILITY: HOUSING INSECURITY
IN THE LAST 12 MONTHS, WAS THERE A TIME WHEN YOU DID NOT HAVE A STEADY PLACE TO SLEEP OR SLEPT IN A SHELTER (INCLUDING NOW)?: NO

## 2024-04-04 SDOH — ECONOMIC STABILITY: INCOME INSECURITY: HOW HARD IS IT FOR YOU TO PAY FOR THE VERY BASICS LIKE FOOD, HOUSING, MEDICAL CARE, AND HEATING?: NOT HARD AT ALL

## 2024-04-04 ASSESSMENT — PATIENT HEALTH QUESTIONNAIRE - PHQ9
SUM OF ALL RESPONSES TO PHQ QUESTIONS 1-9: 21
6. FEELING BAD ABOUT YOURSELF - OR THAT YOU ARE A FAILURE OR HAVE LET YOURSELF OR YOUR FAMILY DOWN: NEARLY EVERY DAY
8. MOVING OR SPEAKING SO SLOWLY THAT OTHER PEOPLE COULD HAVE NOTICED. OR THE OPPOSITE, BEING SO FIGETY OR RESTLESS THAT YOU HAVE BEEN MOVING AROUND A LOT MORE THAN USUAL: NEARLY EVERY DAY
3. TROUBLE FALLING OR STAYING ASLEEP: NEARLY EVERY DAY
1. LITTLE INTEREST OR PLEASURE IN DOING THINGS: NOT AT ALL
SUM OF ALL RESPONSES TO PHQ QUESTIONS 1-9: 21
SUM OF ALL RESPONSES TO PHQ9 QUESTIONS 1 & 2: 3
10. IF YOU CHECKED OFF ANY PROBLEMS, HOW DIFFICULT HAVE THESE PROBLEMS MADE IT FOR YOU TO DO YOUR WORK, TAKE CARE OF THINGS AT HOME, OR GET ALONG WITH OTHER PEOPLE: VERY DIFFICULT
2. FEELING DOWN, DEPRESSED OR HOPELESS: NEARLY EVERY DAY
5. POOR APPETITE OR OVEREATING: NEARLY EVERY DAY
4. FEELING TIRED OR HAVING LITTLE ENERGY: NEARLY EVERY DAY
7. TROUBLE CONCENTRATING ON THINGS, SUCH AS READING THE NEWSPAPER OR WATCHING TELEVISION: NEARLY EVERY DAY
9. THOUGHTS THAT YOU WOULD BE BETTER OFF DEAD, OR OF HURTING YOURSELF: NOT AT ALL

## 2024-04-04 ASSESSMENT — COLUMBIA-SUICIDE SEVERITY RATING SCALE - C-SSRS
2. HAVE YOU ACTUALLY HAD ANY THOUGHTS OF KILLING YOURSELF?: NO
6. HAVE YOU EVER DONE ANYTHING, STARTED TO DO ANYTHING, OR PREPARED TO DO ANYTHING TO END YOUR LIFE?: NO
1. WITHIN THE PAST MONTH, HAVE YOU WISHED YOU WERE DEAD OR WISHED YOU COULD GO TO SLEEP AND NOT WAKE UP?: NO

## 2024-04-04 ASSESSMENT — ANXIETY QUESTIONNAIRES
5. BEING SO RESTLESS THAT IT IS HARD TO SIT STILL: NEARLY EVERY DAY
1. FEELING NERVOUS, ANXIOUS, OR ON EDGE: NEARLY EVERY DAY
7. FEELING AFRAID AS IF SOMETHING AWFUL MIGHT HAPPEN: NEARLY EVERY DAY
IF YOU CHECKED OFF ANY PROBLEMS ON THIS QUESTIONNAIRE, HOW DIFFICULT HAVE THESE PROBLEMS MADE IT FOR YOU TO DO YOUR WORK, TAKE CARE OF THINGS AT HOME, OR GET ALONG WITH OTHER PEOPLE: VERY DIFFICULT
6. BECOMING EASILY ANNOYED OR IRRITABLE: NEARLY EVERY DAY
4. TROUBLE RELAXING: NEARLY EVERY DAY
2. NOT BEING ABLE TO STOP OR CONTROL WORRYING: NEARLY EVERY DAY
GAD7 TOTAL SCORE: 21
3. WORRYING TOO MUCH ABOUT DIFFERENT THINGS: NEARLY EVERY DAY

## 2024-04-04 ASSESSMENT — ENCOUNTER SYMPTOMS
RESPIRATORY NEGATIVE: 1
NAUSEA: 1

## 2024-04-04 NOTE — PROGRESS NOTES
Subjective    Hayden Lindo is a 23 y.o. male who presents today for the following: Pt presents for concerns of diabetic symptoms which have increased in frequency over the past month. Pt endorses polyuria, polydipsia, fatigue, light-headed, low moods, and increased irritability which have been intermittent for some time but noticeably increased over the past month. Pt states that last night, 2 hours after his dinner which consisted of mostly potatoes his BG was 322. Pt states that he has been attempting to eat healthier and only drinks water. Pt is being seen by his psychiatrist in a week and plans to discuss medication options at that time for his mood changes.   Chief Complaint   Patient presents with    Blood Pressure Check    Dizziness    Urinary Frequency           PMH/PSH/Allergies/Social History/medication list and most recent studies reviewed with patient.     reports that he has never smoked. He has never used smokeless tobacco.    reports current alcohol use.     Vitals:    04/04/24 1522   BP: 124/82   Pulse:    Resp:    Temp:    SpO2:      Body mass index is 41.82 kg/m².      4/4/2024     3:07 PM 9/5/2023     9:06 AM 5/16/2023     9:53 AM 3/6/2023     9:20 AM 9/13/2022     1:33 PM 1/27/2022     8:47 AM 7/8/2021     1:24 PM   Weight Metrics   Weight 334 lb 9.6 oz 358 lb 9.6 oz 380 lb 387 lb 387 lb 6 oz 381 lb 365 lb 9.6 oz   BMI (Calculated) 41.9 kg/m2 44.9 kg/m2 47.6 kg/m2 48.5 kg/m2 48.5 kg/m2 47.7 kg/m2 45.8 kg/m2       Past Medical History:   Diagnosis Date    ADHD (attention deficit hyperactivity disorder)     Anxiety     Asthma     Hypercholesteremia     Hypertension     Mood swings        Allergies   Allergen Reactions    Seasonal         Current Outpatient Medications on File Prior to Visit   Medication Sig Dispense Refill    lisinopril (PRINIVIL;ZESTRIL) 10 MG tablet TAKE 1 TABLET BY MOUTH EVERY DAY 90 tablet 0    vitamin D (ERGOCALCIFEROL) 1.25 MG (34440 UT) CAPS capsule Take 1 capsule by mouth

## 2024-04-04 NOTE — PROGRESS NOTES
Chief Complaint   Patient presents with    Blood Pressure Check    Dizziness    Urinary Frequency     \"Have you been to the ER, urgent care clinic since your last visit?  Hospitalized since your last visit?\"    NO    “Have you seen or consulted any other health care providers outside of [facility] since your last visit?”    NO            Click Here for Release of Records Request

## 2024-04-05 LAB
ALBUMIN SERPL-MCNC: 4.7 G/DL (ref 4.3–5.2)
ALBUMIN/GLOB SERPL: 2 {RATIO} (ref 1.2–2.2)
ALP SERPL-CCNC: 90 IU/L (ref 44–121)
ALT SERPL-CCNC: 35 IU/L (ref 0–44)
APPEARANCE UR: CLEAR
AST SERPL-CCNC: 18 IU/L (ref 0–40)
BASOPHILS # BLD AUTO: 0 X10E3/UL (ref 0–0.2)
BASOPHILS NFR BLD AUTO: 1 %
BILIRUB SERPL-MCNC: 0.3 MG/DL (ref 0–1.2)
BILIRUB UR QL STRIP: NEGATIVE
BUN SERPL-MCNC: 11 MG/DL (ref 6–20)
BUN/CREAT SERPL: 12 (ref 9–20)
CALCIUM SERPL-MCNC: 10.2 MG/DL (ref 8.7–10.2)
CHLORIDE SERPL-SCNC: 96 MMOL/L (ref 96–106)
CHOLEST SERPL-MCNC: 205 MG/DL (ref 100–199)
CO2 SERPL-SCNC: 23 MMOL/L (ref 20–29)
COLOR UR: YELLOW
CREAT SERPL-MCNC: 0.89 MG/DL (ref 0.76–1.27)
EGFRCR SERPLBLD CKD-EPI 2021: 123 ML/MIN/1.73
EOSINOPHIL # BLD AUTO: 0.1 X10E3/UL (ref 0–0.4)
EOSINOPHIL NFR BLD AUTO: 1 %
ERYTHROCYTE [DISTWIDTH] IN BLOOD BY AUTOMATED COUNT: 13.1 % (ref 11.6–15.4)
GLOBULIN SER CALC-MCNC: 2.4 G/DL (ref 1.5–4.5)
GLUCOSE SERPL-MCNC: 281 MG/DL (ref 70–99)
GLUCOSE UR QL STRIP: ABNORMAL
HBA1C MFR BLD: 10.1 % (ref 4.8–5.6)
HCT VFR BLD AUTO: 51.4 % (ref 37.5–51)
HDLC SERPL-MCNC: 36 MG/DL
HGB BLD-MCNC: 17.2 G/DL (ref 13–17.7)
HGB UR QL STRIP: NEGATIVE
IMM GRANULOCYTES # BLD AUTO: 0 X10E3/UL (ref 0–0.1)
IMM GRANULOCYTES NFR BLD AUTO: 0 %
IMP & REVIEW OF LAB RESULTS: NORMAL
KETONES UR QL STRIP: ABNORMAL
LDLC SERPL CALC-MCNC: 136 MG/DL (ref 0–99)
LEUKOCYTE ESTERASE UR QL STRIP: NEGATIVE
LYMPHOCYTES # BLD AUTO: 1.8 X10E3/UL (ref 0.7–3.1)
LYMPHOCYTES NFR BLD AUTO: 24 %
Lab: NORMAL
MCH RBC QN AUTO: 29.7 PG (ref 26.6–33)
MCHC RBC AUTO-ENTMCNC: 33.5 G/DL (ref 31.5–35.7)
MCV RBC AUTO: 89 FL (ref 79–97)
MONOCYTES # BLD AUTO: 0.4 X10E3/UL (ref 0.1–0.9)
MONOCYTES NFR BLD AUTO: 5 %
NEUTROPHILS # BLD AUTO: 5 X10E3/UL (ref 1.4–7)
NEUTROPHILS NFR BLD AUTO: 69 %
NITRITE UR QL STRIP: NEGATIVE
PH UR STRIP: 6 [PH] (ref 5–7.5)
PLATELET # BLD AUTO: 197 X10E3/UL (ref 150–450)
POTASSIUM SERPL-SCNC: 5.3 MMOL/L (ref 3.5–5.2)
PROT SERPL-MCNC: 7.1 G/DL (ref 6–8.5)
PROT UR QL STRIP: NEGATIVE
RBC # BLD AUTO: 5.8 X10E6/UL (ref 4.14–5.8)
SODIUM SERPL-SCNC: 135 MMOL/L (ref 134–144)
SP GR UR STRIP: >=1.03 (ref 1–1.03)
TRIGL SERPL-MCNC: 182 MG/DL (ref 0–149)
TSH SERPL DL<=0.005 MIU/L-ACNC: 1.52 UIU/ML (ref 0.45–4.5)
UROBILINOGEN UR STRIP-MCNC: 0.2 MG/DL (ref 0.2–1)
VIT B12 SERPL-MCNC: 419 PG/ML (ref 232–1245)
VLDLC SERPL CALC-MCNC: 33 MG/DL (ref 5–40)
WBC # BLD AUTO: 7.3 X10E3/UL (ref 3.4–10.8)

## 2024-04-08 DIAGNOSIS — E11.65 UNCONTROLLED TYPE 2 DIABETES MELLITUS WITH HYPERGLYCEMIA (HCC): ICD-10-CM

## 2024-04-08 DIAGNOSIS — E78.89 LIPIDS ABNORMAL: Primary | ICD-10-CM

## 2024-04-08 RX ORDER — SEMAGLUTIDE 1.34 MG/ML
0.25 INJECTION, SOLUTION SUBCUTANEOUS WEEKLY
Qty: 1.5 ML | Refills: 2 | Status: SHIPPED | OUTPATIENT
Start: 2024-04-08 | End: 2024-05-06

## 2024-04-08 RX ORDER — ATORVASTATIN CALCIUM 10 MG/1
10 TABLET, FILM COATED ORAL DAILY
Qty: 90 TABLET | Refills: 0 | Status: SHIPPED | OUTPATIENT
Start: 2024-04-08

## 2024-04-08 NOTE — TELEPHONE ENCOUNTER
Hayden Lindo was called and verbalized understanding on note below.     ----- Message from SHELIA Carranza NP sent at 4/5/2024 12:50 PM EDT -----  Please call patient :     He is now not a controlled diabetic. He will need to start treatment. Refer to DM education. Send in Ozempic low dose for once a week. Begin metformin 500 mg BID    Start Lipitor 10 mg as her lipids are not controlled. Please review a heart health diet with patient     Encourage water as k is slightly high     Make a follow up for 3 months

## 2024-04-11 ENCOUNTER — PATIENT MESSAGE (OUTPATIENT)
Age: 24
End: 2024-04-11

## 2024-04-11 DIAGNOSIS — E11.65 UNCONTROLLED TYPE 2 DIABETES MELLITUS WITH HYPERGLYCEMIA (HCC): Primary | ICD-10-CM

## 2024-04-12 ENCOUNTER — PATIENT MESSAGE (OUTPATIENT)
Age: 24
End: 2024-04-12

## 2024-04-12 RX ORDER — LANCETS 30 GAUGE
EACH MISCELLANEOUS
Qty: 100 EACH | Refills: 5 | Status: SHIPPED | OUTPATIENT
Start: 2024-04-12

## 2024-04-12 RX ORDER — UBIQUINOL 100 MG
100 CAPSULE ORAL 3 TIMES DAILY
Qty: 100 EACH | Refills: 5 | Status: SHIPPED | OUTPATIENT
Start: 2024-04-12

## 2024-04-12 RX ORDER — GLUCOSAMINE HCL/CHONDROITIN SU 500-400 MG
CAPSULE ORAL
Qty: 100 STRIP | Refills: 5 | Status: SHIPPED | OUTPATIENT
Start: 2024-04-12

## 2024-04-12 NOTE — TELEPHONE ENCOUNTER
From: Hayden Lindo  To: Norma Jones  Sent: 4/12/2024 6:27 AM EDT  Subject: Jury Duty    Can I get a letter excusing me from jury duty please. I am focused on trying to get my health together but right now I’m really struggling mentally and physically.     Sincerely,  Hayden Lindo

## 2024-04-12 NOTE — TELEPHONE ENCOUNTER
From: Hayden Lindo  To: Norma Jones  Sent: 4/11/2024 2:52 PM EDT  Subject: Glucose monitor    Could you please send me in a prescription for a glucose monitor with Bluetooth, test strips, and lancets to [address] [place], [address]. I would greatly appreciate it.     Sincerely   Hayden Lindo

## 2024-04-15 ENCOUNTER — TELEPHONE (OUTPATIENT)
Age: 24
End: 2024-04-15

## 2024-04-15 NOTE — TELEPHONE ENCOUNTER
Patient's mother called stating that the pharamy told her that the glucose meter and the test strips needs prior authorization. She said they told her the scripts were for the one touch meter and strips. I don't see any brand specified. Please call pharmacy to clear this up. [facility]/pharmacy #[id number] - [place], [place] - [address] [facility] -  [phone] -  [phone]     Also prior authorization is needed for ozempic

## 2024-05-06 ENCOUNTER — TELEPHONE (OUTPATIENT)
Age: 24
End: 2024-05-06

## 2024-05-23 ENCOUNTER — PATIENT MESSAGE (OUTPATIENT)
Age: 24
End: 2024-05-23

## 2024-05-23 DIAGNOSIS — E11.65 UNCONTROLLED TYPE 2 DIABETES MELLITUS WITH HYPERGLYCEMIA (HCC): ICD-10-CM

## 2024-05-23 RX ORDER — GLUCOSAMINE HCL/CHONDROITIN SU 500-400 MG
CAPSULE ORAL
Qty: 100 STRIP | Refills: 5 | Status: SHIPPED | OUTPATIENT
Start: 2024-05-23

## 2024-05-23 NOTE — TELEPHONE ENCOUNTER
From: Hayden Lindo  To: Norma Jones  Sent: 5/23/2024 7:41 AM EDT  Subject: Test strips    I spoke with the pharmacy and they said the reason I need a prior authorization for my diabetic test strips is because of the way the prescription is written. If it is written for testing my blood up to twice a day it will cover the 50 strips a month. If you could please send a new prescription that would greatly be appreciated. Also is there any update on the prior authorization for the ozempic?    Sincerely,  Hayden Lindo

## 2024-05-24 ENCOUNTER — PATIENT MESSAGE (OUTPATIENT)
Age: 24
End: 2024-05-24

## 2024-05-24 DIAGNOSIS — E11.65 UNCONTROLLED TYPE 2 DIABETES MELLITUS WITH HYPERGLYCEMIA (HCC): Primary | ICD-10-CM

## 2024-05-24 NOTE — TELEPHONE ENCOUNTER
From: MANUELITO MIGUEL  To: Hayden Lindo  Sent: 5/24/2024 9:21 AM EDT  Subject: Ozempic    Good morning,    Your insurance is denying coverage on the Ozempic. They want you to try one of their preferred medications. I sent that to Norma as well and she would like you to try Trulicity, its also a weekly injection. If you are ok with this please let us know and we can send this in, let me know if you have any questions.       Demetria BILLINGS

## 2024-06-11 DIAGNOSIS — I10 ESSENTIAL (PRIMARY) HYPERTENSION: ICD-10-CM

## 2024-06-11 RX ORDER — LISINOPRIL 10 MG/1
10 TABLET ORAL DAILY
Qty: 90 TABLET | Refills: 0 | Status: SHIPPED | OUTPATIENT
Start: 2024-06-11

## 2024-06-14 DIAGNOSIS — E11.65 UNCONTROLLED TYPE 2 DIABETES MELLITUS WITH HYPERGLYCEMIA (HCC): ICD-10-CM

## 2024-06-17 RX ORDER — DULAGLUTIDE 0.75 MG/.5ML
INJECTION, SOLUTION SUBCUTANEOUS
Qty: 3 ML | Refills: 0 | Status: SHIPPED | OUTPATIENT
Start: 2024-06-17

## 2024-07-05 ENCOUNTER — OFFICE VISIT (OUTPATIENT)
Age: 24
End: 2024-07-05
Payer: MEDICAID

## 2024-07-05 VITALS
HEART RATE: 77 BPM | BODY MASS INDEX: 38.67 KG/M2 | DIASTOLIC BLOOD PRESSURE: 80 MMHG | HEIGHT: 75 IN | WEIGHT: 311 LBS | SYSTOLIC BLOOD PRESSURE: 120 MMHG | OXYGEN SATURATION: 96 %

## 2024-07-05 DIAGNOSIS — E78.89 LIPIDS ABNORMAL: ICD-10-CM

## 2024-07-05 DIAGNOSIS — I10 ESSENTIAL (PRIMARY) HYPERTENSION: ICD-10-CM

## 2024-07-05 DIAGNOSIS — E66.9 OBESITY (BMI 30-39.9): ICD-10-CM

## 2024-07-05 DIAGNOSIS — E11.65 UNCONTROLLED TYPE 2 DIABETES MELLITUS WITH HYPERGLYCEMIA (HCC): Primary | ICD-10-CM

## 2024-07-05 DIAGNOSIS — E11.65 UNCONTROLLED TYPE 2 DIABETES MELLITUS WITH HYPERGLYCEMIA (HCC): ICD-10-CM

## 2024-07-05 PROCEDURE — 3079F DIAST BP 80-89 MM HG: CPT | Performed by: INTERNAL MEDICINE

## 2024-07-05 PROCEDURE — 3046F HEMOGLOBIN A1C LEVEL >9.0%: CPT | Performed by: INTERNAL MEDICINE

## 2024-07-05 PROCEDURE — 99214 OFFICE O/P EST MOD 30 MIN: CPT | Performed by: INTERNAL MEDICINE

## 2024-07-05 PROCEDURE — 3074F SYST BP LT 130 MM HG: CPT | Performed by: INTERNAL MEDICINE

## 2024-07-05 RX ORDER — DULAGLUTIDE 1.5 MG/.5ML
1.5 INJECTION, SOLUTION SUBCUTANEOUS WEEKLY
Qty: 4 ADJUSTABLE DOSE PRE-FILLED PEN SYRINGE | Refills: 1 | Status: SHIPPED | OUTPATIENT
Start: 2024-07-05

## 2024-07-05 ASSESSMENT — ENCOUNTER SYMPTOMS
RESPIRATORY NEGATIVE: 1
GASTROINTESTINAL NEGATIVE: 1

## 2024-07-05 NOTE — PROGRESS NOTES
Chief Complaint   Patient presents with    Follow-up    Diabetes     \"Have you been to the ER, urgent care clinic since your last visit?  Hospitalized since your last visit?\"    NO    “Have you seen or consulted any other health care providers outside of [facility] since your last visit?”    NO            Click Here for Release of Records Request

## 2024-07-05 NOTE — PROGRESS NOTES
Subjective    Hayden Lindo is a 23 y.o. male who presents today for the following:  Chief Complaint   Patient presents with    Follow-up    Diabetes       History of Present Illness  The patient presents for follow-up of diabetes.    The patient reports an overall improvement in her condition over the past 3 months, although he occasionally experiences episodes of nausea and fatigue when her glucose levels are elevated. he monitors her glucose levels twice daily, typically upon waking, prior to meals, or post-dinner, or prior to dinner if she has not consumed any food.     he is currently on a regimen of metformin 500 mg twice daily and Trulicity 0.75 mg weekly, which she tolerates well. he attributes her weight loss to dietary and Trulicity use, as he no longer experiences daytime hunger. His diet primarily consists of fruits, vegetables, protein shakes, and eggs. She continues to experience frequent urination, less severe than during her last visit in 04/2024.    Patient has also last over 20 lbs since last visit.   Supplemental Information  She is on Lipitor 10 mg for her cholesterol.        PMH/PSH/Allergies/Social History/medication list and most recent studies reviewed with patient.     reports that he has never smoked. He has never used smokeless tobacco.    reports current alcohol use.   Results  Laboratory Studies  A1c was 10 in April.     Vitals:    07/05/24 1037   BP: 120/80   Pulse: 77   SpO2: 96%     Body mass index is 38.87 kg/m².      7/5/2024    10:37 AM 4/4/2024     3:07 PM 9/5/2023     9:06 AM 5/16/2023     9:53 AM 3/6/2023     9:20 AM 9/13/2022     1:33 PM 1/27/2022     8:47 AM   Weight Metrics   Weight 311 lb 334 lb 9.6 oz 358 lb 9.6 oz 380 lb 387 lb 387 lb 6 oz 381 lb   BMI (Calculated) 39 kg/m2 41.9 kg/m2 44.9 kg/m2 47.6 kg/m2 48.5 kg/m2 48.5 kg/m2 47.7 kg/m2       Past Medical History:   Diagnosis Date    ADHD (attention deficit hyperactivity disorder)     Anxiety     Asthma

## 2024-07-06 LAB
ALBUMIN SERPL-MCNC: 4.8 G/DL (ref 4.3–5.2)
ALP SERPL-CCNC: 89 IU/L (ref 44–121)
ALT SERPL-CCNC: 14 IU/L (ref 0–44)
AST SERPL-CCNC: 8 IU/L (ref 0–40)
BILIRUB SERPL-MCNC: 0.3 MG/DL (ref 0–1.2)
BUN SERPL-MCNC: 11 MG/DL (ref 6–20)
BUN/CREAT SERPL: 12 (ref 9–20)
CALCIUM SERPL-MCNC: 10.4 MG/DL (ref 8.7–10.2)
CHLORIDE SERPL-SCNC: 95 MMOL/L (ref 96–106)
CHOLEST SERPL-MCNC: 174 MG/DL (ref 100–199)
CO2 SERPL-SCNC: 23 MMOL/L (ref 20–29)
CREAT SERPL-MCNC: 0.92 MG/DL (ref 0.76–1.27)
EGFRCR SERPLBLD CKD-EPI 2021: 119 ML/MIN/1.73
GLOBULIN SER CALC-MCNC: 2.3 G/DL (ref 1.5–4.5)
GLUCOSE SERPL-MCNC: 305 MG/DL (ref 70–99)
HDLC SERPL-MCNC: 37 MG/DL
LDLC SERPL CALC-MCNC: 112 MG/DL (ref 0–99)
POTASSIUM SERPL-SCNC: 4.8 MMOL/L (ref 3.5–5.2)
PROT SERPL-MCNC: 7.1 G/DL (ref 6–8.5)
SODIUM SERPL-SCNC: 133 MMOL/L (ref 134–144)
TRIGL SERPL-MCNC: 140 MG/DL (ref 0–149)
VLDLC SERPL CALC-MCNC: 25 MG/DL (ref 5–40)

## 2024-07-08 LAB
HBA1C MFR BLD: 11.9 % (ref 4.8–5.6)
IMP & REVIEW OF LAB RESULTS: NORMAL
Lab: NORMAL

## 2024-07-09 ENCOUNTER — TELEPHONE (OUTPATIENT)
Age: 24
End: 2024-07-09

## 2024-07-09 NOTE — TELEPHONE ENCOUNTER
Called 01:26 PM  Spoke with patient after verifying name and . Notified patient of lab results and recommendation from provider. Patient verbalized understanding and given a chance to ask questions.        ----- Message from SHELIA Carranza NP sent at 2024 10:54 AM EDT -----  Please call patient: his diabetes is still not controlled. I sent in increase dose just a few days ago. Increase to the next dose in 4 weeks. Increase metformin to 1000mg BID. Review     Lipids are a lot better. Continue to work on lean diet     Please make a follow up 3 months

## 2024-08-05 DIAGNOSIS — E78.89 LIPIDS ABNORMAL: ICD-10-CM

## 2024-08-06 RX ORDER — ATORVASTATIN CALCIUM 10 MG/1
10 TABLET, FILM COATED ORAL DAILY
Qty: 30 TABLET | Refills: 2 | Status: SHIPPED | OUTPATIENT
Start: 2024-08-06

## 2024-09-03 DIAGNOSIS — E11.65 UNCONTROLLED TYPE 2 DIABETES MELLITUS WITH HYPERGLYCEMIA (HCC): ICD-10-CM

## 2024-09-03 RX ORDER — DULAGLUTIDE 1.5 MG/.5ML
INJECTION, SOLUTION SUBCUTANEOUS
Qty: 2 ADJUSTABLE DOSE PRE-FILLED PEN SYRINGE | Refills: 1 | Status: SHIPPED | OUTPATIENT
Start: 2024-09-03

## 2024-09-09 ENCOUNTER — TELEPHONE (OUTPATIENT)
Age: 24
End: 2024-09-09

## 2024-09-09 DIAGNOSIS — E11.65 UNCONTROLLED TYPE 2 DIABETES MELLITUS WITH HYPERGLYCEMIA (HCC): Primary | ICD-10-CM

## 2024-10-16 DIAGNOSIS — I10 ESSENTIAL (PRIMARY) HYPERTENSION: ICD-10-CM

## 2024-10-16 RX ORDER — LISINOPRIL 10 MG/1
10 TABLET ORAL DAILY
Qty: 90 TABLET | Refills: 0 | Status: SHIPPED | OUTPATIENT
Start: 2024-10-16

## 2024-10-28 ENCOUNTER — OFFICE VISIT (OUTPATIENT)
Age: 24
End: 2024-10-28
Payer: MEDICAID

## 2024-10-28 VITALS
HEART RATE: 82 BPM | DIASTOLIC BLOOD PRESSURE: 78 MMHG | HEIGHT: 75 IN | WEIGHT: 302.1 LBS | SYSTOLIC BLOOD PRESSURE: 120 MMHG | BODY MASS INDEX: 37.56 KG/M2 | RESPIRATION RATE: 18 BRPM | OXYGEN SATURATION: 98 %

## 2024-10-28 DIAGNOSIS — I10 ESSENTIAL (PRIMARY) HYPERTENSION: ICD-10-CM

## 2024-10-28 DIAGNOSIS — R82.998 DARK URINE: ICD-10-CM

## 2024-10-28 DIAGNOSIS — E78.2 MIXED HYPERLIPIDEMIA: ICD-10-CM

## 2024-10-28 DIAGNOSIS — I10 ESSENTIAL (PRIMARY) HYPERTENSION: Primary | ICD-10-CM

## 2024-10-28 DIAGNOSIS — E11.65 UNCONTROLLED TYPE 2 DIABETES MELLITUS WITH HYPERGLYCEMIA (HCC): ICD-10-CM

## 2024-10-28 LAB — HBA1C MFR BLD: 6.1 % (ref 4.8–5.6)

## 2024-10-28 PROCEDURE — 3046F HEMOGLOBIN A1C LEVEL >9.0%: CPT | Performed by: INTERNAL MEDICINE

## 2024-10-28 PROCEDURE — 99214 OFFICE O/P EST MOD 30 MIN: CPT | Performed by: INTERNAL MEDICINE

## 2024-10-28 PROCEDURE — 3078F DIAST BP <80 MM HG: CPT | Performed by: INTERNAL MEDICINE

## 2024-10-28 PROCEDURE — 3074F SYST BP LT 130 MM HG: CPT | Performed by: INTERNAL MEDICINE

## 2024-10-28 RX ORDER — DULAGLUTIDE 1.5 MG/.5ML
1.5 INJECTION, SOLUTION SUBCUTANEOUS WEEKLY
Qty: 4 ADJUSTABLE DOSE PRE-FILLED PEN SYRINGE | Refills: 1 | Status: SHIPPED | OUTPATIENT
Start: 2024-10-28

## 2024-10-28 ASSESSMENT — ENCOUNTER SYMPTOMS
RESPIRATORY NEGATIVE: 1
GASTROINTESTINAL NEGATIVE: 1

## 2024-10-28 NOTE — PROGRESS NOTES
Chief Complaint   Patient presents with    Anxiety    Diabetes    Depression    ADHD    Medication Check     \"Have you been to the ER, urgent care clinic since your last visit?  Hospitalized since your last visit?\"    NO    “Have you seen or consulted any other health care providers outside our system since your last visit?”    NO             
within the normal range. He is currently on Lisinopril 10 mg. No changes to his medication regimen are needed at this time. He is advised to continue monitoring his blood pressure and maintain a healthy diet.    3. Hyperlipidemia.  He is currently on Lipitor 10 mg for cholesterol management. Blood work has been ordered to assess his cholesterol levels. He is advised to continue his current medication regimen and maintain a healthy diet.    4. Dehydration.  He reports darker urine, likely due to dehydration from not drinking enough water because of a toothache. He is advised to stay hydrated. A urine test has been ordered to rule out any other potential issues.    5. Mental Health Management.  He reports his mental health has been good and he sees his psychiatrist every 3 months. No changes to his mental health medications have been made. He is advised to continue his current medication regimen and follow up with his psychiatrist as scheduled.        Hayden was seen today for anxiety, diabetes, depression, adhd and medication check.    Diagnoses and all orders for this visit:    Essential (primary) hypertension  -     Comprehensive Metabolic Panel; Future    Uncontrolled type 2 diabetes mellitus with hyperglycemia (HCC)  -     Comprehensive Metabolic Panel; Future  -     Hemoglobin A1C; Future    Mixed hyperlipidemia  -     Lipid Panel; Future    Dark urine  -     Urinalysis; Future    Reviewed with patient medication side effects in detail   I answered all patient questions and concerns  Labs and testing done and/or upcoming labs/test were reviewed and discussed   Reviewed and discussed daily activity, exercise and diet        Return in about 3 months (around 1/28/2025) for follow up for routine visit or before if needed, follow up if symptoms persist.     SHELIA Carranza - NP      The patient (or guardian, if applicable) and other individuals in attendance with the patient were advised that [employer]

## 2024-10-29 LAB
ALBUMIN SERPL-MCNC: 4.7 G/DL (ref 4.3–5.2)
ALP SERPL-CCNC: 75 IU/L (ref 44–121)
ALT SERPL-CCNC: 22 IU/L (ref 0–44)
AST SERPL-CCNC: 17 IU/L (ref 0–40)
BILIRUB SERPL-MCNC: 0.3 MG/DL (ref 0–1.2)
BUN SERPL-MCNC: 9 MG/DL (ref 6–20)
BUN/CREAT SERPL: 13 (ref 9–20)
CALCIUM SERPL-MCNC: 9.9 MG/DL (ref 8.7–10.2)
CHLORIDE SERPL-SCNC: 102 MMOL/L (ref 96–106)
CHOLEST SERPL-MCNC: 128 MG/DL (ref 100–199)
CO2 SERPL-SCNC: 24 MMOL/L (ref 20–29)
CREAT SERPL-MCNC: 0.7 MG/DL (ref 0.76–1.27)
EGFRCR SERPLBLD CKD-EPI 2021: 132 ML/MIN/1.73
GLOBULIN SER CALC-MCNC: 2.1 G/DL (ref 1.5–4.5)
GLUCOSE SERPL-MCNC: 93 MG/DL (ref 70–99)
HDLC SERPL-MCNC: 41 MG/DL
IMP & REVIEW OF LAB RESULTS: NORMAL
LDLC SERPL CALC-MCNC: 74 MG/DL (ref 0–99)
Lab: NORMAL
POTASSIUM SERPL-SCNC: 5.2 MMOL/L (ref 3.5–5.2)
PROT SERPL-MCNC: 6.8 G/DL (ref 6–8.5)
SODIUM SERPL-SCNC: 142 MMOL/L (ref 134–144)
TRIGL SERPL-MCNC: 63 MG/DL (ref 0–149)
VLDLC SERPL CALC-MCNC: 13 MG/DL (ref 5–40)

## 2024-10-30 LAB
APPEARANCE UR: CLEAR
BILIRUB UR QL STRIP: NEGATIVE
COLOR UR: YELLOW
GLUCOSE UR QL STRIP: NEGATIVE
HGB UR QL STRIP: NEGATIVE
KETONES UR QL STRIP: ABNORMAL
LEUKOCYTE ESTERASE UR QL STRIP: ABNORMAL
NITRITE UR QL STRIP: NEGATIVE
PH UR STRIP: 7 [PH] (ref 5–7.5)
PROT UR QL STRIP: NEGATIVE
SP GR UR STRIP: 1.02 (ref 1–1.03)
UROBILINOGEN UR STRIP-MCNC: 1 MG/DL (ref 0.2–1)

## 2024-11-27 DIAGNOSIS — E11.65 UNCONTROLLED TYPE 2 DIABETES MELLITUS WITH HYPERGLYCEMIA (HCC): Primary | ICD-10-CM

## 2024-11-27 RX ORDER — DULAGLUTIDE 0.75 MG/.5ML
0.75 INJECTION, SOLUTION SUBCUTANEOUS WEEKLY
Qty: 3 ADJUSTABLE DOSE PRE-FILLED PEN SYRINGE | Refills: 1 | Status: SHIPPED | OUTPATIENT
Start: 2024-11-27

## 2024-12-02 DIAGNOSIS — E78.89 LIPIDS ABNORMAL: ICD-10-CM

## 2024-12-02 RX ORDER — ATORVASTATIN CALCIUM 10 MG/1
10 TABLET, FILM COATED ORAL DAILY
Qty: 30 TABLET | Refills: 2 | Status: SHIPPED | OUTPATIENT
Start: 2024-12-02

## 2025-03-31 DIAGNOSIS — E78.89 LIPIDS ABNORMAL: ICD-10-CM

## 2025-03-31 RX ORDER — ATORVASTATIN CALCIUM 10 MG/1
10 TABLET, FILM COATED ORAL DAILY
Qty: 30 TABLET | Refills: 2 | Status: SHIPPED | OUTPATIENT
Start: 2025-03-31

## 2025-04-12 DIAGNOSIS — I10 ESSENTIAL (PRIMARY) HYPERTENSION: ICD-10-CM

## 2025-04-12 DIAGNOSIS — E11.65 UNCONTROLLED TYPE 2 DIABETES MELLITUS WITH HYPERGLYCEMIA (HCC): ICD-10-CM

## 2025-04-14 ENCOUNTER — OFFICE VISIT (OUTPATIENT)
Age: 25
End: 2025-04-14
Payer: MEDICAID

## 2025-04-14 VITALS
BODY MASS INDEX: 38.48 KG/M2 | DIASTOLIC BLOOD PRESSURE: 64 MMHG | WEIGHT: 309.5 LBS | HEIGHT: 75 IN | OXYGEN SATURATION: 95 % | SYSTOLIC BLOOD PRESSURE: 102 MMHG | HEART RATE: 75 BPM | TEMPERATURE: 98.8 F | RESPIRATION RATE: 22 BRPM

## 2025-04-14 DIAGNOSIS — E11.65 UNCONTROLLED TYPE 2 DIABETES MELLITUS WITH HYPERGLYCEMIA (HCC): ICD-10-CM

## 2025-04-14 DIAGNOSIS — E11.65 UNCONTROLLED TYPE 2 DIABETES MELLITUS WITH HYPERGLYCEMIA (HCC): Primary | ICD-10-CM

## 2025-04-14 DIAGNOSIS — E78.2 MIXED HYPERLIPIDEMIA: ICD-10-CM

## 2025-04-14 DIAGNOSIS — I10 ESSENTIAL (PRIMARY) HYPERTENSION: ICD-10-CM

## 2025-04-14 DIAGNOSIS — E66.9 OBESITY (BMI 30-39.9): ICD-10-CM

## 2025-04-14 LAB — HBA1C MFR BLD: 6.4 % (ref 4.8–5.6)

## 2025-04-14 PROCEDURE — 3078F DIAST BP <80 MM HG: CPT | Performed by: INTERNAL MEDICINE

## 2025-04-14 PROCEDURE — 99214 OFFICE O/P EST MOD 30 MIN: CPT | Performed by: INTERNAL MEDICINE

## 2025-04-14 PROCEDURE — 3074F SYST BP LT 130 MM HG: CPT | Performed by: INTERNAL MEDICINE

## 2025-04-14 RX ORDER — ATORVASTATIN CALCIUM 10 MG/1
10 TABLET, FILM COATED ORAL DAILY
Qty: 90 TABLET | Refills: 1 | Status: SHIPPED | OUTPATIENT
Start: 2025-04-14

## 2025-04-14 RX ORDER — LISINOPRIL 10 MG/1
10 TABLET ORAL DAILY
Qty: 90 TABLET | Refills: 0 | Status: SHIPPED | OUTPATIENT
Start: 2025-04-14 | End: 2025-04-14 | Stop reason: SDUPTHER

## 2025-04-14 RX ORDER — LISINOPRIL 10 MG/1
10 TABLET ORAL DAILY
Qty: 90 TABLET | Refills: 1 | Status: SHIPPED | OUTPATIENT
Start: 2025-04-14

## 2025-04-14 RX ORDER — HYDROCORTISONE 25 MG/G
CREAM TOPICAL 2 TIMES DAILY
COMMUNITY
Start: 2025-02-10

## 2025-04-14 SDOH — ECONOMIC STABILITY: FOOD INSECURITY: WITHIN THE PAST 12 MONTHS, YOU WORRIED THAT YOUR FOOD WOULD RUN OUT BEFORE YOU GOT MONEY TO BUY MORE.: NEVER TRUE

## 2025-04-14 SDOH — ECONOMIC STABILITY: FOOD INSECURITY: WITHIN THE PAST 12 MONTHS, THE FOOD YOU BOUGHT JUST DIDN'T LAST AND YOU DIDN'T HAVE MONEY TO GET MORE.: NEVER TRUE

## 2025-04-14 SDOH — ECONOMIC STABILITY: INCOME INSECURITY: IN THE LAST 12 MONTHS, WAS THERE A TIME WHEN YOU WERE NOT ABLE TO PAY THE MORTGAGE OR RENT ON TIME?: NO

## 2025-04-14 SDOH — ECONOMIC STABILITY: TRANSPORTATION INSECURITY
IN THE PAST 12 MONTHS, HAS LACK OF TRANSPORTATION KEPT YOU FROM MEETINGS, WORK, OR FROM GETTING THINGS NEEDED FOR DAILY LIVING?: NO

## 2025-04-14 SDOH — ECONOMIC STABILITY: TRANSPORTATION INSECURITY
IN THE PAST 12 MONTHS, HAS THE LACK OF TRANSPORTATION KEPT YOU FROM MEDICAL APPOINTMENTS OR FROM GETTING MEDICATIONS?: NO

## 2025-04-14 ASSESSMENT — PATIENT HEALTH QUESTIONNAIRE - PHQ9
4. FEELING TIRED OR HAVING LITTLE ENERGY: NOT AT ALL
3. TROUBLE FALLING OR STAYING ASLEEP: NOT AT ALL
5. POOR APPETITE OR OVEREATING: NOT AT ALL
6. FEELING BAD ABOUT YOURSELF - OR THAT YOU ARE A FAILURE OR HAVE LET YOURSELF OR YOUR FAMILY DOWN: NOT AT ALL
SUM OF ALL RESPONSES TO PHQ QUESTIONS 1-9: 0
SUM OF ALL RESPONSES TO PHQ QUESTIONS 1-9: 0
7. TROUBLE CONCENTRATING ON THINGS, SUCH AS READING THE NEWSPAPER OR WATCHING TELEVISION: NOT AT ALL
8. MOVING OR SPEAKING SO SLOWLY THAT OTHER PEOPLE COULD HAVE NOTICED. OR THE OPPOSITE, BEING SO FIGETY OR RESTLESS THAT YOU HAVE BEEN MOVING AROUND A LOT MORE THAN USUAL: NOT AT ALL
9. THOUGHTS THAT YOU WOULD BE BETTER OFF DEAD, OR OF HURTING YOURSELF: NOT AT ALL
1. LITTLE INTEREST OR PLEASURE IN DOING THINGS: NOT AT ALL
SUM OF ALL RESPONSES TO PHQ QUESTIONS 1-9: 0
2. FEELING DOWN, DEPRESSED OR HOPELESS: NOT AT ALL
10. IF YOU CHECKED OFF ANY PROBLEMS, HOW DIFFICULT HAVE THESE PROBLEMS MADE IT FOR YOU TO DO YOUR WORK, TAKE CARE OF THINGS AT HOME, OR GET ALONG WITH OTHER PEOPLE: NOT DIFFICULT AT ALL
SUM OF ALL RESPONSES TO PHQ QUESTIONS 1-9: 0

## 2025-04-14 ASSESSMENT — ENCOUNTER SYMPTOMS
NAUSEA: 1
RESPIRATORY NEGATIVE: 1

## 2025-04-15 ENCOUNTER — RESULTS FOLLOW-UP (OUTPATIENT)
Age: 25
End: 2025-04-15

## 2025-04-15 LAB
ALBUMIN SERPL-MCNC: 4.5 G/DL (ref 4.3–5.2)
ALP SERPL-CCNC: 82 IU/L (ref 44–121)
ALT SERPL-CCNC: 21 IU/L (ref 0–44)
AST SERPL-CCNC: 17 IU/L (ref 0–40)
BILIRUB SERPL-MCNC: <0.2 MG/DL (ref 0–1.2)
BUN SERPL-MCNC: 8 MG/DL (ref 6–20)
BUN/CREAT SERPL: 11 (ref 9–20)
CALCIUM SERPL-MCNC: 9.8 MG/DL (ref 8.7–10.2)
CHLORIDE SERPL-SCNC: 101 MMOL/L (ref 96–106)
CO2 SERPL-SCNC: 22 MMOL/L (ref 20–29)
CREAT SERPL-MCNC: 0.75 MG/DL (ref 0.76–1.27)
EGFRCR SERPLBLD CKD-EPI 2021: 128 ML/MIN/1.73
GLOBULIN SER CALC-MCNC: 2.3 G/DL (ref 1.5–4.5)
GLUCOSE SERPL-MCNC: 87 MG/DL (ref 70–99)
POTASSIUM SERPL-SCNC: 5 MMOL/L (ref 3.5–5.2)
PROT SERPL-MCNC: 6.8 G/DL (ref 6–8.5)
SODIUM SERPL-SCNC: 140 MMOL/L (ref 134–144)

## 2025-04-23 LAB
ALBUMIN/CREAT UR: 7 MG/G CREAT (ref 0–29)
CREAT UR-MCNC: 254.8 MG/DL
MICROALBUMIN UR-MCNC: 17.8 UG/ML

## 2025-06-08 DIAGNOSIS — E11.65 UNCONTROLLED TYPE 2 DIABETES MELLITUS WITH HYPERGLYCEMIA (HCC): ICD-10-CM

## 2025-06-09 RX ORDER — DULAGLUTIDE 0.75 MG/.5ML
INJECTION, SOLUTION SUBCUTANEOUS
Qty: 2 ADJUSTABLE DOSE PRE-FILLED PEN SYRINGE | Refills: 5 | Status: SHIPPED | OUTPATIENT
Start: 2025-06-09